# Patient Record
Sex: FEMALE | Race: ASIAN | NOT HISPANIC OR LATINO | URBAN - METROPOLITAN AREA
[De-identification: names, ages, dates, MRNs, and addresses within clinical notes are randomized per-mention and may not be internally consistent; named-entity substitution may affect disease eponyms.]

---

## 2023-02-15 VITALS
OXYGEN SATURATION: 97 % | SYSTOLIC BLOOD PRESSURE: 180 MMHG | WEIGHT: 154.98 LBS | HEIGHT: 62.2 IN | TEMPERATURE: 98 F | RESPIRATION RATE: 18 BRPM | DIASTOLIC BLOOD PRESSURE: 78 MMHG | HEART RATE: 59 BPM

## 2023-02-15 RX ORDER — CHLORHEXIDINE GLUCONATE 213 G/1000ML
1 SOLUTION TOPICAL ONCE
Refills: 0 | Status: DISCONTINUED | OUTPATIENT
Start: 2023-02-22 | End: 2023-03-08

## 2023-02-15 NOTE — H&P ADULT - NSICDXPASTMEDICALHX_GEN_ALL_CORE_FT
PAST MEDICAL HISTORY:  History of left bundle branch block (LBBB)     HLD (hyperlipidemia)     HTN (hypertension)     Osteoarthritis

## 2023-02-15 NOTE — H&P ADULT - NSHPLABSRESULTS_GEN_ALL_CORE
11.2   6.52  )-----------( 272      ( 22 Feb 2023 10:52 )             33.9       02-22    139  |  100  |  20  ----------------------------<  133<H>  see note   |  29  |  0.58    Ca    9.9      22 Feb 2023 11:19  Mg     1.9     02-22    TPro  7.7  /  Alb  4.4  /  TBili  0.6  /  DBili  x   /  AST  see note  /  ALT  see note  /  AlkPhos  see note  02-22      PT/INR - ( 22 Feb 2023 10:52 )   PT: 12.0 sec;   INR: 1.01          PTT - ( 22 Feb 2023 10:52 )  PTT:34.4 sec    CARDIAC MARKERS ( 22 Feb 2023 11:19 )  x     / x     / 467 U/L / x     / 11.3 ng/mL            EKG:

## 2023-02-15 NOTE — H&P ADULT - ASSESSMENT
67 year old female with PMHX of HLD, HTN, non-obstructive CAD s/p cath in 2021 @[place], LBB, and osteoarthritis, who is presenting for cardiac cath due to pts risk factors, CCS class 2 anginal symptoms and abnormal NST, pt now presents to [facility] for recommended cardiac catheterization with possible intervention if clinically indicated.     -H/H: 11.2/33.9. Pt denies BRBPR, hematuria, hematochezia, melena. Pt compliant w ASA 81 mg, last dose yesterday 2/21/22. Given ASA 81 mg x 1 and Plavix 600mg x1  - Hypertensive pre-procedure with SBP in 180s, given IV Hydralazine 10mg x1  -Cr: 0.058. EF 50%. Euvolemic on exam.  ml bolus deferred given elevated BP. IVF @ 75cc/hr started pre procedure  -Cardiac Catheterization ordered placed   -Home Medication Confirmed via: Medication verified by patient at bedside, reliable source   -Type of sedation: Moderate  -Candidate for sedation: Yes    Risks & benefits of procedure and alternative therapy have been explained to the patient including but not limited to: allergic reaction, bleeding w/possible need for blood transfusion, infection, renal and vascular compromise, limb damage, arrhythmia, stroke, vessel dissection/perforation, Myocardial infarction, emergent CABG. Informed consent obtained and in chart.   67 year old female with PMHX of HLD, HTN, non-obstructive CAD s/p cath in 2021 @[place], LBB, and osteoarthritis, who is presenting for cardiac cath due to pts risk factors, CCS class 2 anginal symptoms and abnormal NST, pt now presents to [facility] for recommended cardiac catheterization with possible intervention if clinically indicated.     -H/H: 11.2/33.9. Pt denies BRBPR, hematuria, hematochezia, melena. Pt compliant w ASA 81 mg, last dose yesterday 2/21/22. Given ASA 81 mg x 1 and Plavix 600mg x1  - Hypertensive pre-procedure with SBP in 180s, given IV Hydralazine 10mg x1  -Cr: 0.058. EF 50%. Euvolemic on exam.  ml bolus deferred given elevated BP. IVF @ 75cc/hr started pre procedure  -Cardiac Catheterization ordered placed   -Home Medication Confirmed via: Medication verified by patient at bedside, reliable source   -Type of sedation: Moderate  -Candidate for sedation: Yes    Consent obtained, signed and in chart. Risks & benefits of procedure and alternative therapy have been explained to the patient including but not limited to: allergic reaction, bleeding w/possible need for blood transfusion, infection, renal and vascular compromise, limb damage, arrhythmia, stroke, vessel dissection/perforation, Myocardial infarction, emergent CABG. Informed consent obtained and in chart.

## 2023-02-15 NOTE — H&P ADULT - HISTORY OF PRESENT ILLNESS
Cardio Dr. Gabrielle SHERMAN  Pharmacy  Escort:     Patient is a 67 year old female with PMHX of Hypertriglyceridemia, HTN, Osteoarthritis, LBB, CAD ( cath in 2021 [place] with  Non obstructive disease) who presented to outpatient cardiologist after an ED visit for CP and complaining of PIPER with light physical exertion occuring with one block of symptoms , trialed on Ranexa but still persistent and went for outpatient NST showing moderate perfusion abnormality in the anterior region which were reversible. Patient ECHO  1/14/2023 with LVEF 50%, mild global hypokinesis. Patient denies dizziness, palpitations, orthopnea/PND, leg swelling, LOC, bleeding, melena/hematochezia, fever, chills, URI symptoms, or recent illness. In light of pts risk factors, CCS class 2 anginal symptoms and abnormal NST, pt now presents to [facility] for recommended cardiac catheterization with possible intervention if clinically indicated.  Cardio Dr. Gabrielle SHERMAN  Pharmacy  Escort:     Patient is a 67 year old female with PMHX of Hypertriglyceridemia, HTN, Osteoarthritis, LBB, CAD ( cath in 2021 [place] with  nonobstructive disease) who presented to outpatient cardiologist after an ED visit for CP and complaining of PIPER with light physical exertion occuring with one block. Per MD note, patient's workup at [place] ED was negative with negative CT Chest and negative duplex.  trialed on Ranexa but still persistent and went for outpatient NST showing moderate perfusion abnormality in the anterior region which were reversible. Patient ECHO  1/14/2023 with LVEF 50%, mild global hypokinesis. Patient denies dizziness, palpitations, orthopnea/PND, leg swelling, LOC, bleeding, melena/hematochezia, fever, chills, URI symptoms, or recent illness. In light of pts risk factors, CCS class 2 anginal symptoms and abnormal NST, pt now presents to [facility] for recommended cardiac catheterization with possible intervention if clinically indicated.  Cardio Dr. Anthony   COVID: Negative 2/17 (Printed in chart)  Pharmacy: [facility]  Escort: Kusum (granddaughter)    67 year old female with PMHx of HLD, HTN, non-obstructive CAD s/p cath in 2021 @[place], LBB, and osteoarthritis,who initally presented to outpatient cardiologist, Dr. Anthony, with complaints of PIPER on light physical exertion (ambulation of 1 block). Most recently, she was seen for follow up after visiting the ED @ [place] for CP and worsening PIPER. Per MD note, patient's workup at [place] ED was negative with negative CT Chest and negative duplex.   NST12/28/22: Moderate perfusion abnormality of moderate intensity is present in the anterior region on the stress images which was reversible in the rest images. Overall left ventricular systolic function abnormal with RWMA (anterior hypokinesis). TTE 1/14/23: Left ventricular systolic function mildly reduced with EF of 50%. Mild global left ventricular hypokinesis. Mild MR. Mild TR. Patient denies dizziness, palpitations, orthopnea/PND, leg swelling, LOC, bleeding, melena/hematochezia, fever, chills, URI symptoms, or recent illness. She was started on Ranexa but per MD note has not been compliant. NST 6/2016: small apical and lateral ischemia     In light of pts risk factors, CCS class 2 anginal symptoms and abnormal NST, pt now presents to [facility] for recommended cardiac catheterization with possible intervention if clinically indicated.        Cardiologist: Dr. Anthony   COVID: Negative 2/17 (Printed in chart)  Pharmacy: [facility]  Escort: Kusum (granddaughter)    H&P obtained w/ use of , ID # [id number]    66 yo, Mandarin speaking female, with PMHx of HLD, HTN, non-obstructive CAD s/p cath in 2021 @[place], LBB, and osteoarthritis,who initally presented to outpatient cardiologist, Dr. Anthony, with complaints of PIEPR on light physical exertion (ambulation of 1 block). Most recently, she was seen for follow up after visiting the ED @ [place] for CP and worsening PIPER. Per MD note, patient's workup at [place] ED was negative with negative CT Chest and negative duplex.   NST12/28/22: Moderate perfusion abnormality of moderate intensity is present in the anterior region on the stress images which was reversible in the rest images. Overall left ventricular systolic function abnormal with RWMA (anterior hypokinesis). TTE 1/14/23: Left ventricular systolic function mildly reduced with EF of 50%. Mild global left ventricular hypokinesis. Mild MR. Mild TR. Patient denies dizziness, palpitations, orthopnea/PND, leg swelling, LOC, bleeding, melena/hematochezia, fever, chills, URI symptoms, or recent illness. She was started on Ranexa but per MD note has not been compliant. NST 6/2016: small apical and lateral ischemia     In light of pts risk factors, CCS class 2 anginal symptoms and abnormal NST, pt now presents to [facility] for recommended cardiac catheterization with possible intervention if clinically indicated.

## 2023-02-22 ENCOUNTER — OUTPATIENT (OUTPATIENT)
Dept: OUTPATIENT SERVICES | Facility: HOSPITAL | Age: 68
LOS: 1 days | Discharge: HOME | End: 2023-02-22
Payer: COMMERCIAL

## 2023-02-22 LAB
A1C WITH ESTIMATED AVERAGE GLUCOSE RESULT: 6.1 % — HIGH (ref 4–5.6)
ALBUMIN SERPL ELPH-MCNC: 4.4 G/DL — SIGNIFICANT CHANGE UP (ref 3.3–5)
ALP SERPL-CCNC: SIGNIFICANT CHANGE UP U/L (ref 40–120)
ALT FLD-CCNC: SIGNIFICANT CHANGE UP U/L (ref 10–45)
ANION GAP SERPL CALC-SCNC: 10 MMOL/L — SIGNIFICANT CHANGE UP (ref 5–17)
APTT BLD: 34.4 SEC — SIGNIFICANT CHANGE UP (ref 27.5–35.5)
AST SERPL-CCNC: SIGNIFICANT CHANGE UP U/L (ref 10–40)
BASOPHILS # BLD AUTO: 0.07 K/UL — SIGNIFICANT CHANGE UP (ref 0–0.2)
BASOPHILS NFR BLD AUTO: 1.1 % — SIGNIFICANT CHANGE UP (ref 0–2)
BILIRUB SERPL-MCNC: 0.6 MG/DL — SIGNIFICANT CHANGE UP (ref 0.2–1.2)
BUN SERPL-MCNC: 20 MG/DL — SIGNIFICANT CHANGE UP (ref 7–23)
CALCIUM SERPL-MCNC: 9.9 MG/DL — SIGNIFICANT CHANGE UP (ref 8.4–10.5)
CHLORIDE SERPL-SCNC: 100 MMOL/L — SIGNIFICANT CHANGE UP (ref 96–108)
CHOLEST SERPL-MCNC: 173 MG/DL — SIGNIFICANT CHANGE UP
CK MB CFR SERPL CALC: 11.3 NG/ML — HIGH (ref 0–6.7)
CK SERPL-CCNC: 467 U/L — HIGH (ref 25–170)
CO2 SERPL-SCNC: 29 MMOL/L — SIGNIFICANT CHANGE UP (ref 22–31)
CREAT SERPL-MCNC: 0.58 MG/DL — SIGNIFICANT CHANGE UP (ref 0.5–1.3)
EGFR: 99 ML/MIN/1.73M2 — SIGNIFICANT CHANGE UP
EOSINOPHIL # BLD AUTO: 0.24 K/UL — SIGNIFICANT CHANGE UP (ref 0–0.5)
EOSINOPHIL NFR BLD AUTO: 3.7 % — SIGNIFICANT CHANGE UP (ref 0–6)
ESTIMATED AVERAGE GLUCOSE: 128 MG/DL — HIGH (ref 68–114)
GLUCOSE BLDC GLUCOMTR-MCNC: 128 MG/DL — HIGH (ref 70–99)
GLUCOSE SERPL-MCNC: 133 MG/DL — HIGH (ref 70–99)
HCT VFR BLD CALC: 33.9 % — LOW (ref 34.5–45)
HDLC SERPL-MCNC: 66 MG/DL — SIGNIFICANT CHANGE UP
HGB BLD-MCNC: 11.2 G/DL — LOW (ref 11.5–15.5)
IMM GRANULOCYTES NFR BLD AUTO: 0.2 % — SIGNIFICANT CHANGE UP (ref 0–0.9)
INR BLD: 1.01 — SIGNIFICANT CHANGE UP (ref 0.88–1.16)
LIPID PNL WITH DIRECT LDL SERPL: 86 MG/DL — SIGNIFICANT CHANGE UP
LYMPHOCYTES # BLD AUTO: 2.02 K/UL — SIGNIFICANT CHANGE UP (ref 1–3.3)
LYMPHOCYTES # BLD AUTO: 31 % — SIGNIFICANT CHANGE UP (ref 13–44)
MAGNESIUM SERPL-MCNC: 1.9 MG/DL — SIGNIFICANT CHANGE UP (ref 1.6–2.6)
MCHC RBC-ENTMCNC: 29.8 PG — SIGNIFICANT CHANGE UP (ref 27–34)
MCHC RBC-ENTMCNC: 33 GM/DL — SIGNIFICANT CHANGE UP (ref 32–36)
MCV RBC AUTO: 90.2 FL — SIGNIFICANT CHANGE UP (ref 80–100)
MONOCYTES # BLD AUTO: 0.49 K/UL — SIGNIFICANT CHANGE UP (ref 0–0.9)
MONOCYTES NFR BLD AUTO: 7.5 % — SIGNIFICANT CHANGE UP (ref 2–14)
NEUTROPHILS # BLD AUTO: 3.69 K/UL — SIGNIFICANT CHANGE UP (ref 1.8–7.4)
NEUTROPHILS NFR BLD AUTO: 56.5 % — SIGNIFICANT CHANGE UP (ref 43–77)
NON HDL CHOLESTEROL: 107 MG/DL — SIGNIFICANT CHANGE UP
NRBC # BLD: 0 /100 WBCS — SIGNIFICANT CHANGE UP (ref 0–0)
PLATELET # BLD AUTO: 272 K/UL — SIGNIFICANT CHANGE UP (ref 150–400)
POTASSIUM SERPL-MCNC: SIGNIFICANT CHANGE UP MMOL/L (ref 3.5–5.3)
POTASSIUM SERPL-SCNC: SIGNIFICANT CHANGE UP MMOL/L (ref 3.5–5.3)
PROT SERPL-MCNC: 7.7 G/DL — SIGNIFICANT CHANGE UP (ref 6–8.3)
PROTHROM AB SERPL-ACNC: 12 SEC — SIGNIFICANT CHANGE UP (ref 10.5–13.4)
RBC # BLD: 3.76 M/UL — LOW (ref 3.8–5.2)
RBC # FLD: 12.3 % — SIGNIFICANT CHANGE UP (ref 10.3–14.5)
SODIUM SERPL-SCNC: 139 MMOL/L — SIGNIFICANT CHANGE UP (ref 135–145)
TRIGL SERPL-MCNC: 103 MG/DL — SIGNIFICANT CHANGE UP
WBC # BLD: 6.52 K/UL — SIGNIFICANT CHANGE UP (ref 3.8–10.5)
WBC # FLD AUTO: 6.52 K/UL — SIGNIFICANT CHANGE UP (ref 3.8–10.5)

## 2023-02-22 PROCEDURE — 85730 THROMBOPLASTIN TIME PARTIAL: CPT

## 2023-02-22 PROCEDURE — 83735 ASSAY OF MAGNESIUM: CPT

## 2023-02-22 PROCEDURE — C1887: CPT

## 2023-02-22 PROCEDURE — 93458 L HRT ARTERY/VENTRICLE ANGIO: CPT

## 2023-02-22 PROCEDURE — 93010 ELECTROCARDIOGRAM REPORT: CPT

## 2023-02-22 PROCEDURE — 85610 PROTHROMBIN TIME: CPT

## 2023-02-22 PROCEDURE — 93005 ELECTROCARDIOGRAM TRACING: CPT

## 2023-02-22 PROCEDURE — 36415 COLL VENOUS BLD VENIPUNCTURE: CPT

## 2023-02-22 PROCEDURE — 80061 LIPID PANEL: CPT

## 2023-02-22 PROCEDURE — 85025 COMPLETE CBC W/AUTO DIFF WBC: CPT

## 2023-02-22 PROCEDURE — 80053 COMPREHEN METABOLIC PANEL: CPT

## 2023-02-22 PROCEDURE — C1769: CPT

## 2023-02-22 PROCEDURE — 93458 L HRT ARTERY/VENTRICLE ANGIO: CPT | Mod: 26

## 2023-02-22 PROCEDURE — 82962 GLUCOSE BLOOD TEST: CPT

## 2023-02-22 PROCEDURE — 83036 HEMOGLOBIN GLYCOSYLATED A1C: CPT

## 2023-02-22 PROCEDURE — 82553 CREATINE MB FRACTION: CPT

## 2023-02-22 PROCEDURE — C1894: CPT

## 2023-02-22 PROCEDURE — 82550 ASSAY OF CK (CPK): CPT

## 2023-02-22 RX ORDER — ATORVASTATIN CALCIUM 80 MG/1
1 TABLET, FILM COATED ORAL
Qty: 0 | Refills: 0 | DISCHARGE

## 2023-02-22 RX ORDER — OMEGA-3 ACID ETHYL ESTERS 1 G
1 CAPSULE ORAL
Qty: 0 | Refills: 0 | DISCHARGE

## 2023-02-22 RX ORDER — RALOXIFENE HYDROCHLORIDE 60 MG/1
1 TABLET, COATED ORAL
Qty: 0 | Refills: 0 | DISCHARGE

## 2023-02-22 RX ORDER — OMEGA-3 ACID ETHYL ESTERS 1 G
2 CAPSULE ORAL
Qty: 0 | Refills: 0 | DISCHARGE

## 2023-02-22 RX ORDER — ASPIRIN/CALCIUM CARB/MAGNESIUM 324 MG
81 TABLET ORAL ONCE
Refills: 0 | Status: COMPLETED | OUTPATIENT
Start: 2023-02-22 | End: 2023-02-22

## 2023-02-22 RX ORDER — SODIUM CHLORIDE 9 MG/ML
500 INJECTION INTRAMUSCULAR; INTRAVENOUS; SUBCUTANEOUS
Refills: 0 | Status: DISCONTINUED | OUTPATIENT
Start: 2023-02-22 | End: 2023-03-08

## 2023-02-22 RX ORDER — HYDRALAZINE HCL 50 MG
10 TABLET ORAL ONCE
Refills: 0 | Status: COMPLETED | OUTPATIENT
Start: 2023-02-22 | End: 2023-02-22

## 2023-02-22 RX ORDER — MULTIVIT-MIN/FERROUS GLUCONATE 9 MG/15 ML
1 LIQUID (ML) ORAL
Qty: 0 | Refills: 0 | DISCHARGE

## 2023-02-22 RX ORDER — METOPROLOL TARTRATE 50 MG
1 TABLET ORAL
Qty: 0 | Refills: 0 | DISCHARGE

## 2023-02-22 RX ORDER — SODIUM CHLORIDE 9 MG/ML
500 INJECTION INTRAMUSCULAR; INTRAVENOUS; SUBCUTANEOUS
Refills: 0 | Status: DISCONTINUED | OUTPATIENT
Start: 2023-02-22 | End: 2023-02-22

## 2023-02-22 RX ORDER — ASPIRIN/CALCIUM CARB/MAGNESIUM 324 MG
1 TABLET ORAL
Qty: 0 | Refills: 0 | DISCHARGE

## 2023-02-22 RX ORDER — CLOPIDOGREL BISULFATE 75 MG/1
300 TABLET, FILM COATED ORAL ONCE
Refills: 0 | Status: DISCONTINUED | OUTPATIENT
Start: 2023-02-22 | End: 2023-02-22

## 2023-02-22 RX ORDER — CLOPIDOGREL BISULFATE 75 MG/1
600 TABLET, FILM COATED ORAL ONCE
Refills: 0 | Status: COMPLETED | OUTPATIENT
Start: 2023-02-22 | End: 2023-02-22

## 2023-02-22 RX ORDER — LOSARTAN/HYDROCHLOROTHIAZIDE 100MG-25MG
1 TABLET ORAL
Qty: 0 | Refills: 0 | DISCHARGE

## 2023-02-22 RX ADMIN — Medication 81 MILLIGRAM(S): at 12:21

## 2023-02-22 RX ADMIN — SODIUM CHLORIDE 210 MILLILITER(S): 9 INJECTION INTRAMUSCULAR; INTRAVENOUS; SUBCUTANEOUS at 14:35

## 2023-02-22 RX ADMIN — SODIUM CHLORIDE 75 MILLILITER(S): 9 INJECTION INTRAMUSCULAR; INTRAVENOUS; SUBCUTANEOUS at 12:23

## 2023-02-22 RX ADMIN — CLOPIDOGREL BISULFATE 600 MILLIGRAM(S): 75 TABLET, FILM COATED ORAL at 12:22

## 2023-02-22 RX ADMIN — Medication 10 MILLIGRAM(S): at 12:23

## 2023-02-22 NOTE — PROGRESS NOTE ADULT - SUBJECTIVE AND OBJECTIVE BOX
Interventional Cardiology PA SDA Discharge Note    Patient without complaints. Ambulated and voided without difficulties    Afebrile, VSS    Ext: Right Radial: no hematoma, no bleeding, dressing C/D/I    Pulses: intact RAD to baseline     A/P: 68 y/o Mandarin female w/ PMHx HTN, HLD, non-obstructive CAD (via cardiac cath in 2021 at [place]), known LBBB, and Osteoarthritis who presented for cardiac catheterization w/ possible intervention if clinically indicated in light of patient's risk factors, CCS Class II anginal symptoms, and abnormal NST results. Patient is now s/p diagnostic cardiac catheterization w/ findings of LM normal, mid LAD myocardial bridge w/ mild luminal irregularities, LCx mild luminal irregularities, RCA mild luminal irregularities, and EDP 15 mmHg. Right radial access was used and radial band was eventually removed appropriately and w/o complications.     1. Stable for discharge as per attending Dr. Potts after bed rest, patient voids, wrist stable and 30 minutes of ambulation.  2. Follow-up with PMD/Cardiologist Dr. Anthony in 1-2 weeks.  3. Discharged forms signed and copies in chart.

## 2023-02-27 DIAGNOSIS — R94.39 ABNORMAL RESULT OF OTHER CARDIOVASCULAR FUNCTION STUDY: ICD-10-CM

## 2023-02-27 DIAGNOSIS — I25.110 ATHEROSCLEROTIC HEART DISEASE OF NATIVE CORONARY ARTERY WITH UNSTABLE ANGINA PECTORIS: ICD-10-CM
